# Patient Record
Sex: MALE | Race: ASIAN | NOT HISPANIC OR LATINO
[De-identification: names, ages, dates, MRNs, and addresses within clinical notes are randomized per-mention and may not be internally consistent; named-entity substitution may affect disease eponyms.]

---

## 2017-03-10 ENCOUNTER — APPOINTMENT (OUTPATIENT)
Dept: CT IMAGING | Facility: IMAGING CENTER | Age: 77
End: 2017-03-10

## 2017-03-10 ENCOUNTER — OUTPATIENT (OUTPATIENT)
Dept: OUTPATIENT SERVICES | Facility: HOSPITAL | Age: 77
LOS: 1 days | End: 2017-03-10
Payer: MEDICARE

## 2017-03-10 DIAGNOSIS — Z00.8 ENCOUNTER FOR OTHER GENERAL EXAMINATION: ICD-10-CM

## 2017-03-10 PROCEDURE — 71260 CT THORAX DX C+: CPT

## 2017-03-10 PROCEDURE — 82565 ASSAY OF CREATININE: CPT

## 2017-03-10 PROCEDURE — 74177 CT ABD & PELVIS W/CONTRAST: CPT

## 2018-05-01 ENCOUNTER — APPOINTMENT (OUTPATIENT)
Dept: PULMONOLOGY | Facility: CLINIC | Age: 78
End: 2018-05-01
Payer: MEDICARE

## 2018-05-01 VITALS
WEIGHT: 204 LBS | RESPIRATION RATE: 17 BRPM | DIASTOLIC BLOOD PRESSURE: 76 MMHG | OXYGEN SATURATION: 97 % | SYSTOLIC BLOOD PRESSURE: 128 MMHG | HEART RATE: 89 BPM | BODY MASS INDEX: 34.83 KG/M2 | HEIGHT: 64 IN

## 2018-05-01 DIAGNOSIS — R09.82 POSTNASAL DRIP: ICD-10-CM

## 2018-05-01 DIAGNOSIS — Z87.438 PERSONAL HISTORY OF OTHER DISEASES OF MALE GENITAL ORGANS: ICD-10-CM

## 2018-05-01 DIAGNOSIS — Z86.79 PERSONAL HISTORY OF OTHER DISEASES OF THE CIRCULATORY SYSTEM: ICD-10-CM

## 2018-05-01 DIAGNOSIS — R06.83 SNORING: ICD-10-CM

## 2018-05-01 DIAGNOSIS — R06.02 SHORTNESS OF BREATH: ICD-10-CM

## 2018-05-01 DIAGNOSIS — Z86.39 PERSONAL HISTORY OF OTHER ENDOCRINE, NUTRITIONAL AND METABOLIC DISEASE: ICD-10-CM

## 2018-05-01 DIAGNOSIS — Z82.49 FAMILY HISTORY OF ISCHEMIC HEART DISEASE AND OTHER DISEASES OF THE CIRCULATORY SYSTEM: ICD-10-CM

## 2018-05-01 DIAGNOSIS — E66.3 OVERWEIGHT: ICD-10-CM

## 2018-05-01 DIAGNOSIS — J45.901 ACUTE BRONCHITIS, UNSPECIFIED: ICD-10-CM

## 2018-05-01 DIAGNOSIS — J20.9 ACUTE BRONCHITIS, UNSPECIFIED: ICD-10-CM

## 2018-05-01 PROCEDURE — 99204 OFFICE O/P NEW MOD 45 MIN: CPT | Mod: 25

## 2018-05-01 PROCEDURE — 94618 PULMONARY STRESS TESTING: CPT

## 2018-05-01 PROCEDURE — 94010 BREATHING CAPACITY TEST: CPT | Mod: 59

## 2018-05-01 PROCEDURE — 71046 X-RAY EXAM CHEST 2 VIEWS: CPT

## 2018-05-01 PROCEDURE — 94640 AIRWAY INHALATION TREATMENT: CPT | Mod: 59

## 2018-05-01 RX ORDER — ALBUTEROL SULFATE 2.5 MG/3ML
(2.5 MG/3ML) SOLUTION RESPIRATORY (INHALATION)
Qty: 120 | Refills: 5 | Status: ACTIVE | OUTPATIENT
Start: 2018-05-01

## 2018-07-26 ENCOUNTER — APPOINTMENT (OUTPATIENT)
Dept: PULMONOLOGY | Facility: CLINIC | Age: 78
End: 2018-07-26

## 2018-09-13 ENCOUNTER — RX RENEWAL (OUTPATIENT)
Age: 78
End: 2018-09-13

## 2018-10-02 ENCOUNTER — APPOINTMENT (OUTPATIENT)
Dept: CT IMAGING | Facility: IMAGING CENTER | Age: 78
End: 2018-10-02

## 2018-10-04 ENCOUNTER — APPOINTMENT (OUTPATIENT)
Dept: CT IMAGING | Facility: IMAGING CENTER | Age: 78
End: 2018-10-04
Payer: MEDICARE

## 2018-10-04 ENCOUNTER — OUTPATIENT (OUTPATIENT)
Dept: OUTPATIENT SERVICES | Facility: HOSPITAL | Age: 78
LOS: 1 days | End: 2018-10-04
Payer: MEDICARE

## 2018-10-04 DIAGNOSIS — Z00.8 ENCOUNTER FOR OTHER GENERAL EXAMINATION: ICD-10-CM

## 2018-10-04 PROCEDURE — 74177 CT ABD & PELVIS W/CONTRAST: CPT

## 2018-10-04 PROCEDURE — 74177 CT ABD & PELVIS W/CONTRAST: CPT | Mod: 26

## 2018-10-04 PROCEDURE — 82565 ASSAY OF CREATININE: CPT

## 2021-08-27 ENCOUNTER — OUTPATIENT (OUTPATIENT)
Dept: OUTPATIENT SERVICES | Facility: HOSPITAL | Age: 81
LOS: 1 days | End: 2021-08-27
Payer: MEDICARE

## 2021-08-27 ENCOUNTER — APPOINTMENT (OUTPATIENT)
Dept: CT IMAGING | Facility: CLINIC | Age: 81
End: 2021-08-27

## 2021-08-27 DIAGNOSIS — R41.82 ALTERED MENTAL STATUS, UNSPECIFIED: ICD-10-CM

## 2021-08-27 PROCEDURE — 70450 CT HEAD/BRAIN W/O DYE: CPT | Mod: MH

## 2021-08-27 PROCEDURE — 70450 CT HEAD/BRAIN W/O DYE: CPT | Mod: 26,MH

## 2021-10-27 ENCOUNTER — OUTPATIENT (OUTPATIENT)
Dept: OUTPATIENT SERVICES | Facility: HOSPITAL | Age: 81
LOS: 1 days | Discharge: HOME | End: 2021-10-27

## 2021-10-27 DIAGNOSIS — C82.90 FOLLICULAR LYMPHOMA, UNSPECIFIED, UNSPECIFIED SITE: ICD-10-CM

## 2021-11-03 ENCOUNTER — NON-APPOINTMENT (OUTPATIENT)
Age: 81
End: 2021-11-03

## 2021-11-03 ENCOUNTER — RESULT REVIEW (OUTPATIENT)
Age: 81
End: 2021-11-03

## 2021-11-03 ENCOUNTER — TRANSCRIPTION ENCOUNTER (OUTPATIENT)
Age: 81
End: 2021-11-03

## 2021-11-03 ENCOUNTER — APPOINTMENT (OUTPATIENT)
Dept: HEMATOLOGY ONCOLOGY | Facility: CLINIC | Age: 81
End: 2021-11-03
Payer: MEDICARE

## 2021-11-03 VITALS
RESPIRATION RATE: 16 BRPM | SYSTOLIC BLOOD PRESSURE: 136 MMHG | HEART RATE: 103 BPM | OXYGEN SATURATION: 99 % | TEMPERATURE: 97.6 F | DIASTOLIC BLOOD PRESSURE: 85 MMHG

## 2021-11-03 DIAGNOSIS — R53.2 FUNCTIONAL QUADRIPLEGIA: ICD-10-CM

## 2021-11-03 DIAGNOSIS — K12.30 ORAL MUCOSITIS (ULCERATIVE), UNSPECIFIED: ICD-10-CM

## 2021-11-03 DIAGNOSIS — F03.90 UNSPECIFIED DEMENTIA W/OUT BEHAVIORAL DISTURBANCE: ICD-10-CM

## 2021-11-03 LAB
BASOPHILS # BLD AUTO: 0.05 K/UL — SIGNIFICANT CHANGE UP (ref 0–0.2)
BASOPHILS NFR BLD AUTO: 1 % — SIGNIFICANT CHANGE UP (ref 0–2)
EOSINOPHIL # BLD AUTO: 0.22 K/UL — SIGNIFICANT CHANGE UP (ref 0–0.5)
EOSINOPHIL NFR BLD AUTO: 4 % — SIGNIFICANT CHANGE UP (ref 0–6)
ERYTHROCYTE [SEDIMENTATION RATE] IN BLOOD: 107 MM/HR — HIGH (ref 0–20)
HCT VFR BLD CALC: 28.3 % — LOW (ref 39–50)
HGB BLD-MCNC: 9.3 G/DL — LOW (ref 13–17)
LYMPHOCYTES # BLD AUTO: 0.27 K/UL — LOW (ref 1–3.3)
LYMPHOCYTES # BLD AUTO: 5 % — LOW (ref 13–44)
MCHC RBC-ENTMCNC: 29.3 PG — SIGNIFICANT CHANGE UP (ref 27–34)
MCHC RBC-ENTMCNC: 32.9 G/DL — SIGNIFICANT CHANGE UP (ref 32–36)
MCV RBC AUTO: 89.3 FL — SIGNIFICANT CHANGE UP (ref 80–100)
METAMYELOCYTES # FLD: 1 % — HIGH (ref 0–0)
MONOCYTES # BLD AUTO: 0.27 K/UL — SIGNIFICANT CHANGE UP (ref 0–0.9)
MONOCYTES NFR BLD AUTO: 5 % — SIGNIFICANT CHANGE UP (ref 2–14)
NEUTROPHILS # BLD AUTO: 4.58 K/UL — SIGNIFICANT CHANGE UP (ref 1.8–7.4)
NEUTROPHILS NFR BLD AUTO: 84 % — HIGH (ref 43–77)
NRBC # BLD: 2 /100 — HIGH (ref 0–0)
NRBC # BLD: SIGNIFICANT CHANGE UP /100 WBCS (ref 0–0)
PLAT MORPH BLD: NORMAL — SIGNIFICANT CHANGE UP
PLATELET # BLD AUTO: 170 K/UL — SIGNIFICANT CHANGE UP (ref 150–400)
POLYCHROMASIA BLD QL SMEAR: SLIGHT — SIGNIFICANT CHANGE UP
RBC # BLD: 3.17 M/UL — LOW (ref 4.2–5.8)
RBC # FLD: 17.1 % — HIGH (ref 10.3–14.5)
RBC BLD AUTO: SIGNIFICANT CHANGE UP
WBC # BLD: 5.45 K/UL — SIGNIFICANT CHANGE UP (ref 3.8–10.5)
WBC # FLD AUTO: 5.45 K/UL — SIGNIFICANT CHANGE UP (ref 3.8–10.5)

## 2021-11-03 PROCEDURE — 99205 OFFICE O/P NEW HI 60 MIN: CPT

## 2021-11-03 RX ORDER — VIT A AND D3 IN COD LIVER OIL 1250-135
CAPSULE ORAL
Refills: 0 | Status: ACTIVE | COMMUNITY

## 2021-11-03 RX ORDER — CHLORHEXIDINE GLUCONATE 1.2 MG/ML
0.12 RINSE ORAL
Refills: 0 | Status: ACTIVE | COMMUNITY

## 2021-11-03 RX ORDER — DIPHENHYDRAMINE, LIDOCAINE, NYSTATIN
KIT ORAL
Refills: 0 | Status: ACTIVE | COMMUNITY

## 2021-11-03 RX ORDER — SODIUM CHLORIDE SOLN NEBU 7% 7 %
NEBU SOLN INHALATION
Refills: 0 | Status: ACTIVE | COMMUNITY

## 2021-11-03 RX ORDER — MEMANTINE HYDROCHLORIDE 10 MG/1
10 TABLET, FILM COATED ORAL
Qty: 120 | Refills: 0 | Status: DISCONTINUED | COMMUNITY
Start: 2017-11-13 | End: 2021-11-03

## 2021-11-03 RX ORDER — TAMSULOSIN HYDROCHLORIDE 0.4 MG/1
0.4 CAPSULE ORAL
Qty: 90 | Refills: 0 | Status: DISCONTINUED | COMMUNITY
Start: 2017-10-10 | End: 2021-11-03

## 2021-11-03 RX ORDER — PREDNISONE 10 MG/1
10 TABLET ORAL
Qty: 100 | Refills: 0 | Status: DISCONTINUED | COMMUNITY
Start: 2018-05-01 | End: 2021-11-03

## 2021-11-03 RX ORDER — LEVOFLOXACIN 750 MG/1
750 TABLET, FILM COATED ORAL
Refills: 0 | Status: ACTIVE | COMMUNITY

## 2021-11-03 RX ORDER — DONEPEZIL HYDROCHLORIDE 5 MG/1
5 TABLET ORAL
Qty: 60 | Refills: 0 | Status: DISCONTINUED | COMMUNITY
Start: 2017-11-13 | End: 2021-11-03

## 2021-11-03 RX ORDER — FLUCONAZOLE 200 MG/1
200 TABLET ORAL
Refills: 0 | Status: ACTIVE | COMMUNITY

## 2021-11-03 RX ORDER — MEMANTINE HYDROCHLORIDE 5 MG/1
5 TABLET, FILM COATED ORAL
Qty: 90 | Refills: 0 | Status: DISCONTINUED | COMMUNITY
Start: 2017-10-10 | End: 2021-11-03

## 2021-11-03 RX ORDER — NYSTATIN 100000 [USP'U]/G
100000 CREAM TOPICAL
Refills: 0 | Status: ACTIVE | COMMUNITY

## 2021-11-03 RX ORDER — IPRATROPIUM BROMIDE 0.5 MG/2.5ML
0.02 SOLUTION RESPIRATORY (INHALATION)
Refills: 0 | Status: ACTIVE | COMMUNITY

## 2021-11-03 RX ORDER — LOSARTAN POTASSIUM AND HYDROCHLOROTHIAZIDE 25; 100 MG/1; MG/1
100-25 TABLET ORAL
Qty: 90 | Refills: 0 | Status: DISCONTINUED | COMMUNITY
Start: 2017-10-10 | End: 2021-11-03

## 2021-11-03 RX ORDER — FLUTICASONE PROPIONATE AND SALMETEROL 50; 250 UG/1; UG/1
250-50 POWDER RESPIRATORY (INHALATION)
Qty: 3 | Refills: 0 | Status: DISCONTINUED | COMMUNITY
Start: 2018-05-01 | End: 2021-11-03

## 2021-11-03 RX ORDER — DONEPEZIL HYDROCHLORIDE 10 MG/1
10 TABLET ORAL
Qty: 30 | Refills: 0 | Status: DISCONTINUED | COMMUNITY
Start: 2017-12-18 | End: 2021-11-03

## 2021-11-03 RX ORDER — SIMVASTATIN 20 MG/1
20 TABLET, FILM COATED ORAL
Qty: 90 | Refills: 0 | Status: DISCONTINUED | COMMUNITY
Start: 2017-10-10 | End: 2021-11-03

## 2021-11-03 RX ORDER — OLOPATADINE HYDROCHLORIDE 665 UG/1
0.6 SPRAY, METERED NASAL
Qty: 3 | Refills: 1 | Status: DISCONTINUED | COMMUNITY
Start: 2018-05-01 | End: 2021-11-03

## 2021-11-03 RX ORDER — LEVALBUTEROL HYDROCHLORIDE 0.63 MG/3ML
0.63 SOLUTION RESPIRATORY (INHALATION)
Refills: 0 | Status: ACTIVE | COMMUNITY

## 2021-11-03 RX ORDER — AMLODIPINE BESYLATE 5 MG/1
5 TABLET ORAL
Qty: 90 | Refills: 0 | Status: DISCONTINUED | COMMUNITY
Start: 2017-10-10 | End: 2021-11-03

## 2021-11-03 RX ORDER — FINASTERIDE 5 MG/1
5 TABLET, FILM COATED ORAL
Refills: 0 | Status: ACTIVE | COMMUNITY

## 2021-11-03 RX ORDER — ALLOPURINOL 100 MG/1
100 TABLET ORAL
Refills: 0 | Status: ACTIVE | COMMUNITY

## 2021-11-04 ENCOUNTER — RESULT REVIEW (OUTPATIENT)
Age: 81
End: 2021-11-04

## 2021-11-05 PROBLEM — F03.90 DEMENTIA: Status: ACTIVE | Noted: 2021-11-05

## 2021-11-05 PROBLEM — R53.2 FUNCTIONAL QUADRIPLEGIA: Status: ACTIVE | Noted: 2021-11-05

## 2021-11-05 PROBLEM — K12.30 MUCOSITIS: Status: ACTIVE | Noted: 2021-11-05

## 2021-11-05 LAB
ALBUMIN SERPL ELPH-MCNC: 3.1 G/DL
ALP BLD-CCNC: 151 U/L
ALT SERPL-CCNC: 112 U/L
ANION GAP SERPL CALC-SCNC: 14 MMOL/L
APTT BLD: 36.6 SEC
AST SERPL-CCNC: 56 U/L
BILIRUB SERPL-MCNC: 0.5 MG/DL
BUN SERPL-MCNC: 31 MG/DL
CALCIUM SERPL-MCNC: 10 MG/DL
CHLORIDE SERPL-SCNC: 97 MMOL/L
CO2 SERPL-SCNC: 27 MMOL/L
CREAT SERPL-MCNC: 1.2 MG/DL
CRP SERPL-MCNC: 38 MG/L
GLUCOSE SERPL-MCNC: 107 MG/DL
INR PPP: 1.13 RATIO
LDH SERPL-CCNC: 234 U/L
POTASSIUM SERPL-SCNC: 4.8 MMOL/L
PROT SERPL-MCNC: 5.7 G/DL
PT BLD: 13.3 SEC
SODIUM SERPL-SCNC: 137 MMOL/L
URATE SERPL-MCNC: 3.1 MG/DL

## 2021-11-05 NOTE — RESULTS/DATA
[FreeTextEntry1] : Awaiting full pathology\par \par Imaging in discharge summary attached to chart in outside medical records.

## 2021-11-05 NOTE — PHYSICAL EXAM
[Fully active, able to carry on all pre-disease performance without restriction] : Status 0 - Fully active, able to carry on all pre-disease performance without restriction [Mucositis] : mucositis [Normal] : normal appearance, no rash, nodules, vesicles, ulcers, erythema [de-identified] : Wheelchair bound, O2 via nasal canula, with labored respirations when in the recumbent position but comfortable when sitting up.  [de-identified] : severe mucositis with crusting over on roof of his mouth, appears herpetic. erythema in the back of his mouth and in pharynx.  [de-identified] : wheelchair bound, lower extremity muscle atrophy.
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
## 2021-11-05 NOTE — ASSESSMENT
[FreeTextEntry1] : 80 y/o M with history of HTN and dementia (couple of years) who is here for evaluation and second opinion regarding treatment of DLBCL\par \par -Patient is s/p prolonged hospitalization at [facility] for lethargy / AMS related to hypercalcemia and AMADO 2/2 new diagnosis of DLBCL. When there he was treated with supportive care including IVF hydration, pain medications, and antinausea medications. He was diagnosed via LN biopsy and BMBx with having DLBCL. We are currently in the process of obtaining slides of this diagnosis and reviewing them with our hematopathologist. \par -Cycle 1 complicated by severe grade 3-4 mucositis with inability to take PO. Discussed with family the importance of assisted feedings and supportive care. Will continue mouth care with peridex, added on acyclovir therapy. \par -Will check labs today including LDH and CMP to assess liver function / kidney function.\par -To follow closely and follow up next week to assess patient's recovery at rehab. Currently functional status is an ECOG 4. The suspicion is this is at least partially due to his lymphoma disease, however there is a component of his deconditioning related to the mucositis which may be due to chemotherapy. \par -As per [facility] documentation he was NOT given Rituxan at [facility] although he was found to be CD20 bright positive on flow cytometry. \par -Emotional support provided for family, and given his poor functional status and advanced age discussed his limited potential and likely grim prognosis. They understand but would like to continue to think about giving him another cycle of the chemotherapy if he is showing gains at rehab. GOC discussion ongoing. \par -Patient understands and agrees with plan. All information explained to the best of my ability.\par

## 2021-11-08 ENCOUNTER — RESULT REVIEW (OUTPATIENT)
Age: 81
End: 2021-11-08

## 2021-11-08 DIAGNOSIS — C83.30 DIFFUSE LARGE B-CELL LYMPHOMA, UNSPECIFIED SITE: ICD-10-CM

## 2021-11-09 LAB — HEMATOPATHOLOGY REPORT: SIGNIFICANT CHANGE UP

## 2021-11-10 NOTE — ASSESSMENT
[FreeTextEntry1] : 82 y/o M with history of HTN and dementia (couple of years) who is here for evaluation and second opinion regarding treatment of DLBCL\par \par -Patient is s/p prolonged hospitalization at [facility] for lethargy / AMS related to hypercalcemia and AMADO 2/2 new diagnosis of DLBCL. When there he was treated with supportive care including IVF hydration, pain medications, and antinausea medications. He was diagnosed via LN biopsy and BMBx with having DLBCL. We are currently in the process of obtaining slides of this diagnosis and reviewing them with our hematopathologist. \par -Cycle 1 complicated by severe grade 3-4 mucositis with inability to take PO. Discussed with family the importance of assisted feedings and supportive care. Will continue mouth care with peridex, added on acyclovir therapy. \par -Will check labs today including LDH and CMP to assess liver function / kidney function.\par -To follow closely and follow up next week to assess patient's recovery at rehab. Currently functional status is an ECOG 4. The suspicion is this is at least partially due to his lymphoma disease, however there is a component of his deconditioning related to the mucositis which may be due to chemotherapy. \par -As per [facility] documentation he was NOT given Rituxan at [facility] although he was found to be CD20 bright positive on flow cytometry. \par -Emotional support provided for family, and given his poor functional status and advanced age discussed his limited potential and likely grim prognosis. They understand but would like to continue to think about giving him another cycle of the chemotherapy if he is showing gains at rehab. GOC discussion ongoing. \par -Patient understands and agrees with plan. All information explained to the best of my ability.\par

## 2021-11-10 NOTE — PHYSICAL EXAM
[Fully active, able to carry on all pre-disease performance without restriction] : Status 0 - Fully active, able to carry on all pre-disease performance without restriction [Mucositis] : mucositis [Normal] : normal appearance, no rash, nodules, vesicles, ulcers, erythema [de-identified] : Wheelchair bound, O2 via nasal canula, with labored respirations when in the recumbent position but comfortable when sitting up.  [de-identified] : severe mucositis with crusting over on roof of his mouth, appears herpetic. erythema in the back of his mouth and in pharynx.  [de-identified] : wheelchair bound, lower extremity muscle atrophy.
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
## 2021-11-11 ENCOUNTER — APPOINTMENT (OUTPATIENT)
Dept: HEMATOLOGY ONCOLOGY | Facility: CLINIC | Age: 81
End: 2021-11-11

## 2021-11-11 LAB — HEMATOPATHOLOGY REPORT: SIGNIFICANT CHANGE UP
